# Patient Record
Sex: MALE | Race: WHITE | URBAN - METROPOLITAN AREA
[De-identification: names, ages, dates, MRNs, and addresses within clinical notes are randomized per-mention and may not be internally consistent; named-entity substitution may affect disease eponyms.]

---

## 2020-07-25 ENCOUNTER — TELEPHONE ENCOUNTER (OUTPATIENT)
Dept: URBAN - METROPOLITAN AREA CLINIC 13 | Facility: CLINIC | Age: 45
End: 2020-07-25

## 2020-07-26 ENCOUNTER — TELEPHONE ENCOUNTER (OUTPATIENT)
Dept: URBAN - METROPOLITAN AREA CLINIC 13 | Facility: CLINIC | Age: 45
End: 2020-07-26

## 2021-03-17 ENCOUNTER — OFFICE VISIT (OUTPATIENT)
Dept: URBAN - METROPOLITAN AREA CLINIC 107 | Facility: CLINIC | Age: 46
End: 2021-03-17
Payer: COMMERCIAL

## 2021-03-17 VITALS
DIASTOLIC BLOOD PRESSURE: 75 MMHG | WEIGHT: 176 LBS | HEIGHT: 72 IN | BODY MASS INDEX: 23.84 KG/M2 | SYSTOLIC BLOOD PRESSURE: 114 MMHG | HEART RATE: 49 BPM | TEMPERATURE: 98.3 F | RESPIRATION RATE: 18 BRPM

## 2021-03-17 DIAGNOSIS — K21.9 GERD: ICD-10-CM

## 2021-03-17 DIAGNOSIS — K62.5 BRIGHT RED BLOOD PER RECTUM: ICD-10-CM

## 2021-03-17 DIAGNOSIS — K64.8 INTERNAL HEMORRHOIDS WITH COMPLICATION: ICD-10-CM

## 2021-03-17 PROBLEM — 235595009 GASTROESOPHAGEAL REFLUX DISEASE: Status: ACTIVE | Noted: 2021-03-17

## 2021-03-17 PROCEDURE — 99204 OFFICE O/P NEW MOD 45 MIN: CPT | Performed by: NURSE PRACTITIONER

## 2021-03-17 RX ORDER — SODIUM, POTASSIUM,MAG SULFATES 17.5-3.13G
354 ML SOLUTION, RECONSTITUTED, ORAL ORAL ONCE
Qty: 354 MILLILITER | Refills: 0 | OUTPATIENT
Start: 2021-03-17 | End: 2021-03-18

## 2021-03-17 RX ORDER — OMEPRAZOLE 40 MG/1
1 CAPSULE 30 MINUTES BEFORE MORNING MEAL CAPSULE, DELAYED RELEASE ORAL ONCE A DAY
Qty: 90 | Refills: 4 | OUTPATIENT
Start: 2021-03-17

## 2021-03-17 NOTE — HPI-TODAY'S VISIT:
45-year-old gentleman with a history of complicated internal hemorrhoids status post hemorrhoidal banding, adenomatous colon polyps, presenting for evaluation of rectal bleeding. He was last seen in our office in 2017 for evaluation of red blood per rectum.  A colonoscopy on 2/14/2017 was notable for a 5 mm tubular adenoma at the hepatic flexure and grade I-II internal hemorrhoids.  He subsequently underwent hemorrhoidal ligation of the left lateral and right posterior hemorrhoids x2.  His bleeding persisted despite the banding, and he was referred to colorectal surgery, Dr. Reyna.  He did not proceed with this evaluation. He returns today with ongoing issues of intermittent red blood per rectum.  He has 3-4 soft stools daily, with exacerbations of bright red blood per rectum at least 3 days/week.  He reports associated hemorrhoid prolapse which requires manual reduction.  He has tried Preparation H cream and suppositories without change.  He is not currently on a bowel regimen, and has never tried fiber supplementation.  He complains of excessive gas but denies abdominal pain, nausea or vomiting.  He has noted some improvement in rectal bleeding with alcohol cessation. He is concerned regarding the possible development of new colorectal pathology, including inflammation or malignancy. He is ready to pursue colorectal surgery evaluation if warranted. He complains of chronic GERD, with intermittent postprandial heartburn and regurgitation several times per week.  His symptoms are worsened following dietary indiscretion, such as consumption of pizza or other tomato based products.  He is taking Tums regularly with minimal improvement.

## 2021-04-09 ENCOUNTER — TELEPHONE ENCOUNTER (OUTPATIENT)
Dept: URBAN - METROPOLITAN AREA CLINIC 113 | Facility: CLINIC | Age: 46
End: 2021-04-09

## 2021-04-09 RX ORDER — SODIUM PICOSULFATE, MAGNESIUM OXIDE, AND ANHYDROUS CITRIC ACID 10; 3.5; 12 MG/160ML; G/160ML; G/160ML
320 ML LIQUID ORAL ONCE
Qty: 1 KIT | Refills: 0 | OUTPATIENT
End: 2021-04-11

## 2021-04-14 ENCOUNTER — OFFICE VISIT (OUTPATIENT)
Dept: URBAN - METROPOLITAN AREA SURGERY CENTER 25 | Facility: SURGERY CENTER | Age: 46
End: 2021-04-14
Payer: COMMERCIAL

## 2021-04-14 DIAGNOSIS — K92.1 BLACK STOOL: ICD-10-CM

## 2021-04-14 DIAGNOSIS — K64.0 FIRST DEGREE HEMORRHOIDS: ICD-10-CM

## 2021-04-14 PROCEDURE — 45378 DIAGNOSTIC COLONOSCOPY: CPT | Performed by: INTERNAL MEDICINE

## 2021-04-14 PROCEDURE — G8907 PT DOC NO EVENTS ON DISCHARG: HCPCS | Performed by: INTERNAL MEDICINE

## 2021-04-14 RX ORDER — OMEPRAZOLE 40 MG/1
1 CAPSULE 30 MINUTES BEFORE MORNING MEAL CAPSULE, DELAYED RELEASE ORAL ONCE A DAY
Qty: 90 | Refills: 4 | Status: ACTIVE | COMMUNITY
Start: 2021-03-17

## 2021-05-10 ENCOUNTER — OFFICE VISIT (OUTPATIENT)
Dept: URBAN - METROPOLITAN AREA CLINIC 113 | Facility: CLINIC | Age: 46
End: 2021-05-10
Payer: COMMERCIAL

## 2021-05-10 ENCOUNTER — DASHBOARD ENCOUNTERS (OUTPATIENT)
Age: 46
End: 2021-05-10

## 2021-05-10 VITALS
HEIGHT: 72 IN | DIASTOLIC BLOOD PRESSURE: 71 MMHG | WEIGHT: 172 LBS | SYSTOLIC BLOOD PRESSURE: 123 MMHG | BODY MASS INDEX: 23.3 KG/M2 | HEART RATE: 66 BPM | TEMPERATURE: 98.5 F

## 2021-05-10 DIAGNOSIS — K64.8 INTERNAL HEMORRHOIDS WITH COMPLICATION: ICD-10-CM

## 2021-05-10 PROCEDURE — 99212 OFFICE O/P EST SF 10 MIN: CPT | Performed by: INTERNAL MEDICINE

## 2021-05-10 RX ORDER — OMEPRAZOLE 40 MG/1
1 CAPSULE 30 MINUTES BEFORE MORNING MEAL CAPSULE, DELAYED RELEASE ORAL ONCE A DAY
Qty: 90 | Refills: 4 | Status: ON HOLD | COMMUNITY
Start: 2021-03-17

## 2021-05-10 NOTE — HPI-TODAY'S VISIT:
45-year-old gentleman with a history of complicated internal hemorrhoids status post hemorrhoidal banding, adenomatous colon polyps, presenting for follow-up after colonoscopy. He was last seen 3/17/2021 for evaluation of red blood per rectum secondary to complicated internal hemorrhoids with prolapse, with persistent symptoms despite prior hemorrhoidal banding.  He was instructed to begin a daily fiber supplement and a colonoscopy was planned, with consideration for colorectal surgery evaluation pending clinical course.  Regarding GERD symptoms, he was recommended a trial of omeprazole 40 mg daily. The colonoscopy was performed on 4/14/2021 revealing grade I-II internal hemorrhoids.  Repeat colonoscopy is recommended in 5 years for surveillance due to his history of adenomatous colon polyps. He has not experienced further red blood per rectum since beginning fiber supplementation.  His reflux symptoms have resolved, and he has not required the omeprazole.  He denies abdominal pain, nausea or vomiting.

## 2021-05-10 NOTE — HPI-OTHER HISTORIES
A colonoscopy on 2/14/2017 was notable for a 5 mm tubular adenoma at the hepatic flexure and grade I-II internal hemorrhoids.  He subsequently underwent hemorrhoidal ligation of the left lateral and right posterior hemorrhoids x2.  His bleeding persisted despite the banding, and he was referred to colorectal surgery, Dr. Reyna.  He did not proceed with this evaluation.